# Patient Record
Sex: FEMALE | Employment: FULL TIME | URBAN - METROPOLITAN AREA
[De-identification: names, ages, dates, MRNs, and addresses within clinical notes are randomized per-mention and may not be internally consistent; named-entity substitution may affect disease eponyms.]

---

## 2017-06-05 ENCOUNTER — HOSPITAL ENCOUNTER (OUTPATIENT)
Dept: PHYSICAL THERAPY | Age: 42
Discharge: HOME | End: 2017-06-05
Payer: COMMERCIAL

## 2017-06-05 PROCEDURE — 97161 PT EVAL LOW COMPLEX 20 MIN: CPT

## 2017-06-05 PROCEDURE — 97110 THERAPEUTIC EXERCISES: CPT

## 2017-06-05 NOTE — PROGRESS NOTES
[facility] - [facility], [address] [place] [address]  [phone] fax  Plan of Care/ Statement of Necessity for Physical Therapy Services     Patient name: Joana Anne Start of Care: 2017   Referral source: Cris Lane MD : 1975    Medical Diagnosis: Low back pain [M54.5]   Onset Date:2-3 months    Treatment Diagnosis: back pain   Prior Hospitalization: see medical history Provider#: [id number]   Medications: Verified on Patient summary List    Comorbidities: prior surgeries   Prior Level of Function: Functionally independent, lives with  and three children, 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 Assessment/ key information: Patient is a pleasant 40year old female who presents with complaints of growing back pain over the past year without specific injury. She reports history of some back discomfort with prolonged sitting, but states it has recently worsened as she works in the [place] wearing a lead X-Ray gown for at least 40 hours a week standing up. She is able to occasionally remove the gown, but has discomfort in her Left mid back preventing her from being as active as she would like. Due to her pain she limits that amount of running and yoga that she performs weekly. At initial evaluation Patient demonstrates full lumbar AROM without significant pain; some hypermobility into flexion. Grossly 5/5 upper and lower extremity strength, with mild back pain upon resistance. Negative special tests for radicular symptoms. Tender to palpation in the Left thoracic spine. Slight curvature palpable in the spine. Patient is a good rehab candidate based on premorbid status and will benefit from skilled physical therapy in order to improve overall stability and to more easily perform job tasks.      Evaluation Complexity History MEDIUM  Complexity : 1-2 comorbidities / personal factors will impact the outcome/ POC ; Examination LOW Complexity : 1-2 Standardized tests and measures addressing body structure, function, activity limitation and / or participation in recreation  ;Presentation LOW Complexity : Stable, uncomplicated  ;Clinical Decision Making MEDIUM Complexity : FOTO score of 26-74  Overall Complexity Rating: LOW   Problem List: pain affecting function, decrease ROM, decrease strength, edema affecting function, impaired gait/ balance, decrease ADL/ functional abilitiies, decrease activity tolerance, decrease flexibility/ joint mobility and decrease transfer abilities   Treatment Plan may include any combination of the following: Therapeutic exercise, Therapeutic activities, Neuromuscular re-education, Physical agent/modality, Gait/balance training, Manual therapy, Aquatic therapy, Patient education, Self Care training, Functional mobility training, Home safety training and Stair training  Patient / Family readiness to learn indicated by: asking questions, trying to perform skills and interest  Persons(s) to be included in education: patient (P)  Barriers to Learning/Limitations: None  Patient Goal (s): If I could tolerate it more  Patient Self Reported Health Status: good  Rehabilitation Potential: fair    Short Term Goals: To be accomplished in 1 weeks:  Goal: Patient will be independent and compliant with HEP in order to progress toward long term goals. Status at last note/certification: Issued and reviewed  Long Term Goals: To be accomplished in 8 treatments:  Goal: Patient will improve FOTO assessment score to 78 in order to indicate improved functional abilities. Status at last note/certification: 63  Goal: Patient will report worst back pain as 4/10 or less in order to improve ease of daily tasks.   Status at last note/certification: 7/24  Goal: Patient will report no increase in back pain with driving and standing for at least 45 minutes at a time in order to improve ease of commute to work and completing job tasks. Status at last note/certification: Pain after 45 minutes  Goal: Patient will report at least 50% improvement in overall function in order to transition to premorbid workout routine after discharge. Status at last note/certification: n/a    Frequency / Duration: Patient to be seen 1 times per week for 8 treatments. Patient/ Caregiver education and instruction: Diagnosis, prognosis, exercises   [x]  Plan of care has been reviewed with SALMA Briones, PT 6/5/2017 8:15 AM  _____________________________________________________________________  I certify that the above Therapy Services are being furnished while the patient is under my care. I agree with the treatment plan and certify that this therapy is necessary.     Physician's Signature:____________________  Date:__________Time:______    Please sign and return to [facility] - [address]  [phone] fax

## 2017-06-05 NOTE — PROGRESS NOTES
PT DAILY TREATMENT NOTE     Patient Name: Charmayne Konig  Date:2017  : 1975  [x]  Patient  Verified  Payor: Kg Sanders / Plan: [employer] / Product Type: HMO /    In time:835  Out time:917  Total Treatment Time (min): 42  Visit #: 1 of 8    Treatment Area: Low back pain [M54.5]    SUBJECTIVE  Pain Level (0-10 scale): 3  Any medication changes, allergies to medications, adverse drug reactions, diagnosis change, or new procedure performed?: [x] No    [] Yes (see summary sheet for update)  Subjective functional status/changes:   [] No changes reported      OBJECTIVE    Modality rationale: decrease pain and increase tissue extensibility to improve the patients ability to improve ease of job tasks   Min Type Additional Details    [] Estim:  []Unatt       []IFC  []Premod                        []Other:  []w/ice   []w/heat  Position:  Location:    [] Estim: []Att    []TENS instruct  []NMES                    []Other:  []w/US   []w/ice   []w/heat  Position:  Location:    []  Traction: [] Cervical       []Lumbar                       [] Prone          []Supine                       []Intermittent   []Continuous Lbs:  [] before manual  [] after manual    []  Ultrasound: []Continuous   [] Pulsed                           []1MHz   []3MHz W/cm2:  Location:    []  Iontophoresis with dexamethasone         Location: [] Take home patch   [] In clinic   10 []  Ice     [x]  heat  []  Ice massage  []  Laser   []  Anodyne Position:supine with wedge  Location:back    []  Laser with stim  []  Other:  Position:  Location:    []  Vasopneumatic Device Pressure:       [] lo [] med [] hi   Temperature: [] lo [] med [] hi   [] Skin assessment post-treatment:  []intact []redness- no adverse reaction    []redness - adverse reaction:     24 min [x]Eval                  []Re-Eval       8 min Therapeutic Exercise:  [] See flow sheet :HEP   Rationale: increase strength to improve the patients ability to perform daily tasks    With   [] TE   [] TA   [] neuro   [] other: Patient Education: [x] Review HEP    [] Progressed/Changed HEP based on:   [] positioning   [] body mechanics   [] transfers   [] heat/ice application    [] other:      Other Objective/Functional Measures:      Pain Level (0-10 scale) post treatment: 3    ASSESSMENT/Changes in Function:     Patient will continue to benefit from skilled PT services to modify and progress therapeutic interventions, address functional mobility deficits, address ROM deficits, address strength deficits, analyze and address soft tissue restrictions, analyze and cue movement patterns, analyze and modify body mechanics/ergonomics, assess and modify postural abnormalities, address imbalance/dizziness and instruct in home and community integration to attain remaining goals.      [x]  See Plan of Care  []  See progress note/recertification  []  See Discharge Summary         Progress towards goals / Updated goals:  See POC    PLAN  []  Upgrade activities as tolerated     [x]  Continue plan of care  []  Update interventions per flow sheet       []  Discharge due to:_  []  Other:_      Tre Alvarez PT 6/5/2017  8:14 AM    Future Appointments  Date Time Provider Bob Henry   6/5/2017 8:30 AM ROXANA Shepard

## 2017-06-12 ENCOUNTER — HOSPITAL ENCOUNTER (OUTPATIENT)
Dept: PHYSICAL THERAPY | Age: 42
Discharge: HOME | End: 2017-06-12
Payer: COMMERCIAL

## 2017-06-12 PROCEDURE — 97112 NEUROMUSCULAR REEDUCATION: CPT

## 2017-06-12 NOTE — PROGRESS NOTES
PT DAILY TREATMENT NOTE     Patient Name: Ivan Soriano  Date:2017  : 1975  [x]  Patient  Verified  Payor: Paola Patton / Plan: [employer] / Product Type: HMO /    In time:9:05  Out time:9:40  Total Treatment Time (min): 35  Visit #: 2 of 8    Treatment Area: Low back pain [M54.5]    SUBJECTIVE  Pain Level (0-10 scale):  2  Any medication changes, allergies to medications, adverse drug reactions, diagnosis change, or new procedure performed?: [x] No    [] Yes (see summary sheet for update)  Subjective functional status/changes:   [] No changes reported  I get really stiff after sitting or driving    OBJECTIVE    Modality rationale: decrease pain and increase tissue extensibility to improve the patients ability to improve activity tolerance   Min Type Additional Details    [] Estim:  []Unatt       []IFC  []Premod                        []Other:  []w/ice   []w/heat  Position:  Location:    [] Estim: []Att    []TENS instruct  []NMES                    []Other:  []w/US   []w/ice   []w/heat  Position:  Location:    []  Traction: [] Cervical       []Lumbar                       [] Prone          []Supine                       []Intermittent   []Continuous Lbs:  [] before manual  [] after manual    []  Ultrasound: []Continuous   [] Pulsed                           []1MHz   []3MHz W/cm2:  Location:    []  Iontophoresis with dexamethasone         Location: [] Take home patch   [] In clinic   10 []  Ice     [x]  heat  []  Ice massage  []  Laser   []  Anodyne Position: supine  Location: LB    []  Laser with stim  []  Other:  Position:  Location:    []  Vasopneumatic Device Pressure:       [] lo [] med [] hi   Temperature: [] lo [] med [] hi   [x] Skin assessment post-treatment:  [x]intact []redness- no adverse reaction    []redness - adverse reaction:          25 min Neuromuscular Re-education:  []  See flow sheet :   Rationale: increase strength and improve coordination  to improve the patients ability to imrpove core andpostureal strength forease with standing, job duteis          With   [] TE   [] TA   [] neuro   [] other: Patient Education: [x] Review HEP    [] Progressed/Changed HEP based on:   [] positioning   [] body mechanics   [] transfers   [] heat/ice application    [] other:      Other Objective/Functional Measures:  Initiated ex program     Pain Level (0-10 scale) post treatment:  0    ASSESSMENT/Changes in Function:    First follow-up after eval.  Challenged with UE wall clocks and slides due to pulling and weakness in L shoulder    Patient will continue to benefit from skilled PT services to modify and progress therapeutic interventions, address functional mobility deficits, address ROM deficits, address strength deficits, analyze and address soft tissue restrictions, analyze and cue movement patterns, analyze and modify body mechanics/ergonomics, assess and modify postural abnormalities, address imbalance/dizziness and instruct in home and community integration to attain remaining goals. []  See Plan of Care  []  See progress note/recertification  []  See Discharge Summary         Progress towards goals / Updated goals:Goal: Patient will be independent and compliant with HEP in order to progress toward long term goals. Status at last note/certification: Issued and reviewed  Long Term Goals: To be accomplished in 8 treatments:  Goal: Patient will improve FOTO assessment score to 78 in order to indicate improved functional abilities. Status at last note/certification: 63  Goal: Patient will report worst back pain as 4/10 or less in order to improve ease of daily tasks. Status at last note/certification: 1/88  Goal: Patient will report no increase in back pain with driving and standing for at least 45 minutes at a time in order to improve ease of commute to work and completing job tasks.   Status at last note/certification: Pain after 45 minutes  Goal: Patient will report at least 50% improvement in overall function in order to transition to premorbid workout routine after discharge.   Status at last note/certification: n/a    PLAN  [x]  Upgrade activities as tolerated     []  Continue plan of care  []  Update interventions per flow sheet       []  Discharge due to:_  []  Other:_      Vania Gu, PTA 6/12/2017  9:32 AM    Future Appointments  Date Time Provider Bob Henry   6/21/2017 4:00  [address] [facility]   6/27/2017 4:00 PM Rosi Estevez, PT Simran March

## 2017-06-27 ENCOUNTER — APPOINTMENT (OUTPATIENT)
Dept: PHYSICAL THERAPY | Age: 42
End: 2017-06-27
Payer: COMMERCIAL

## 2017-06-27 NOTE — PROGRESS NOTES
[facility] - [facility], [address] [place] [address]  [phone] fax    Discharge Summary    Patient name: Gela Washburn Start of Care: 2017   Referral source: Margo Castellanos MD : 1975                          Medical Diagnosis: Low back pain [M54.5] Onset Date:2-3 months                          Treatment Diagnosis: back pain   Prior Hospitalization: see medical history Provider#: [id number]   Medications: Verified on Patient summary List    Comorbidities: prior surgeries   Prior Level of Function: Functionally independent, lives with  and three children, 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 Status at last note/certification: Issued and reviewed  Status at discharge: Unable to reassess, unplanned discharge    Goal: Patient will improve FOTO assessment score to 78 in order to indicate improved functional abilities. Status at last note/certification: 63  Status at discharge: Unable to reassess, unplanned discharge    Goal: Patient will report worst back pain as 4/10 or less in order to improve ease of daily tasks. Status at last note/certification:   Status at discharge: Unable to reassess, unplanned discharge    Goal: Patient will report no increase in back pain with driving and standing for at least 45 minutes at a time in order to improve ease of commute to work and completing job tasks. Status at last note/certification: Pain after 45 minutes  Status at discharge: Unable to reassess, unplanned discharge    Goal: Patient will report at least 50% improvement in overall function in order to transition to premorbid workout routine after discharge.   Status at last note/certification: n/a  Status at discharge: Unable to reassess, unplanned discharge      Assessment/ Summary of Care: Patient attended initial evaluation and one follow up session of therapy for back pain. At evaluation she was issued an extensive HEP due to a high copay. At this time she defers treatment; unable to reassess goals due to unplanned discharge. Should she require further treatment in the future we would be pleased to treat her. Thank you for the referral of this Patient.      RECOMMENDATIONS:  [x]Discontinue therapy: []Patient has reached or is progressing toward set goals      [x]Patient is non-compliant or has abdicated      []Due to lack of appreciable progress towards set [facility], PT 6/27/2017 4:46 PM

## 2019-05-30 ENCOUNTER — HOSPITAL ENCOUNTER (OUTPATIENT)
Dept: MAMMOGRAPHY | Age: 44
Discharge: HOME | End: 2019-05-30
Attending: FAMILY MEDICINE
Payer: COMMERCIAL

## 2019-05-30 DIAGNOSIS — Z12.31 VISIT FOR SCREENING MAMMOGRAM: ICD-10-CM

## 2019-05-30 PROCEDURE — 77067 SCR MAMMO BI INCL CAD: CPT

## 2020-01-30 ENCOUNTER — HOSPITAL ENCOUNTER (OUTPATIENT)
Dept: LAB | Age: 45
Discharge: HOME | End: 2020-01-30
Payer: COMMERCIAL

## 2020-01-30 ENCOUNTER — HOSPITAL ENCOUNTER (OUTPATIENT)
Dept: GENERAL RADIOLOGY | Age: 45
Discharge: HOME | End: 2020-01-30
Payer: COMMERCIAL

## 2020-01-30 DIAGNOSIS — Z01.818 PREOP TESTING: ICD-10-CM

## 2020-01-30 DIAGNOSIS — M20.10 ACQUIRED HALLUX VALGUS: ICD-10-CM

## 2020-01-30 DIAGNOSIS — M25.774 OSTEOPHYTE OF RIGHT FOOT: ICD-10-CM

## 2020-01-30 DIAGNOSIS — M20.41 ACQUIRED HAMMER TOE OF RIGHT FOOT: ICD-10-CM

## 2020-01-30 DIAGNOSIS — M77.41 METATARSALGIA OF RIGHT FOOT: ICD-10-CM

## 2020-01-30 DIAGNOSIS — Z01.818 OTHER SPECIFIED PRE-OPERATIVE EXAMINATION: ICD-10-CM

## 2020-01-30 DIAGNOSIS — M20.11 ACQUIRED HALLUX VALGUS OF RIGHT FOOT: ICD-10-CM

## 2020-01-30 LAB
APTT PPP: 26 SEC (ref 23–36.4)
INR PPP: 1 (ref 0.8–1.2)
PROTHROMBIN TIME: 12.6 SEC (ref 11.5–15.2)

## 2020-01-30 PROCEDURE — 36415 COLL VENOUS BLD VENIPUNCTURE: CPT

## 2020-01-30 PROCEDURE — 71046 X-RAY EXAM CHEST 2 VIEWS: CPT

## 2020-01-30 PROCEDURE — 85610 PROTHROMBIN TIME: CPT

## 2020-01-30 PROCEDURE — 85730 THROMBOPLASTIN TIME PARTIAL: CPT

## 2022-03-04 ENCOUNTER — OFFICE VISIT (OUTPATIENT)
Dept: ORTHOPEDIC SURGERY | Age: 47
End: 2022-03-04
Payer: COMMERCIAL

## 2022-03-04 VITALS
HEART RATE: 82 BPM | OXYGEN SATURATION: 99 % | WEIGHT: 160 LBS | HEIGHT: 63 IN | TEMPERATURE: 98.2 F | BODY MASS INDEX: 28.35 KG/M2

## 2022-03-04 DIAGNOSIS — M25.561 RIGHT KNEE PAIN, UNSPECIFIED CHRONICITY: Primary | ICD-10-CM

## 2022-03-04 DIAGNOSIS — S83.91XA SPRAIN OF RIGHT KNEE, UNSPECIFIED LIGAMENT, INITIAL ENCOUNTER: ICD-10-CM

## 2022-03-04 PROCEDURE — 73560 X-RAY EXAM OF KNEE 1 OR 2: CPT | Performed by: PHYSICIAN ASSISTANT

## 2022-03-04 PROCEDURE — 99213 OFFICE O/P EST LOW 20 MIN: CPT | Performed by: PHYSICIAN ASSISTANT

## 2022-03-04 RX ORDER — HYDROCODONE BITARTRATE AND ACETAMINOPHEN 5; 325 MG/1; MG/1
1 TABLET ORAL
Qty: 28 TABLET | Refills: 0 | Status: SHIPPED | OUTPATIENT
Start: 2022-03-04 | End: 2022-03-11

## 2022-03-04 RX ORDER — MELOXICAM 15 MG/1
15 TABLET ORAL
Qty: 30 TABLET | Refills: 2 | Status: SHIPPED | OUTPATIENT
Start: 2022-03-04

## 2022-03-04 NOTE — PROGRESS NOTES
Fernanda Swartz  1975   Chief Complaint   Patient presents with    Knee Pain     follow up knee pain RT         HISTORY OF PRESENT ILLNESS  Fernanda Swartz is a 56 y.o. female who presents today for evaluation of right knee pain. She states she was skiing and after a fall she had some discomfort in her knee. She is able to bear weight but still has some pain on a day-to-day basis. It is more of a nagging nuisance. She is able to do all activities of daily living. Has taken Tylenol for the problem. Pain is a 10/10. Has tried following treatments: Injections:NO; Brace:YES; Therapy:NO; Cane/Crutch:YES       No Known Allergies     Past Medical History:   Diagnosis Date    Diabetes ([facility] [address]     hypoglycemia    Hypertension     Other ill-defined conditions(799.89)     depression      Social History     Socioeconomic History    Marital status:      Spouse name: Not on file    Number of children: Not on file    Years of education: Not on file    Highest education level: Not on file   Occupational History    Not on file   Tobacco Use    Smoking status: Never Smoker    Smokeless tobacco: Never Used   Vaping Use    Vaping Use: Never used   Substance and Sexual Activity    Alcohol use: No    Drug use: Never    Sexual activity: Not on file   Other Topics Concern    Not on file   Social History Narrative    Not on file     Social Determinants of Health     Financial Resource Strain:     Difficulty of Paying Living Expenses: Not on file   Food Insecurity:     Worried About [address] in the Last Year: Not on file    Josephine of Food in the Last Year: Not on file   Transportation Needs:     Lack of Transportation (Medical): Not on file    Lack of Transportation (Non-Medical):  Not on file   Physical Activity:     Days of Exercise per Week: Not on file    Minutes of Exercise per Session: Not on file   Stress:     Feeling of Stress : Not on file   Social Connections:     Frequency of Communication with Friends and Family: Not on file    Frequency of Social Gatherings with Friends and Family: Not on file    Attends Scientologist Services: Not on file    Active Member of Clubs or Organizations: Not on file    Attends Club or Organization Meetings: Not on file    Marital Status: Not on file   Intimate Partner Violence:     Fear of Current or Ex-Partner: Not on file    Emotionally Abused: Not on file    Physically Abused: Not on file    Sexually Abused: Not on file   Housing Stability:     Unable to Pay for Housing in the Last Year: Not on file    Number of [place] in the Last Year: Not on file    Unstable Housing in the Last Year: Not on file      Past Surgical History:   Procedure Laterality Date    HX CHOLECYSTECTOMY        History reviewed. No pertinent family history. Current Outpatient Medications   Medication Sig    meloxicam (Mobic) 15 mg tablet Take 1 Tablet by mouth daily (with breakfast).  HYDROcodone-acetaminophen (Norco) 5-325 mg per tablet Take 1 Tablet by mouth every six (6) hours as needed for Pain for up to 7 days. Max Daily Amount: 4 Tablets.  potassium chloride (K-DUR, KLOR-CON) 20 mEq tablet Take 0.5 Tabs by mouth daily.  traMADol (ULTRAM) 50 mg tablet Take 50 mg by mouth every six (6) hours as needed.  LORazepam (ATIVAN) 0.5 mg tablet Take 0.5 mg by mouth.  buPROPion XL (WELLBUTRIN XL) 300 mg XL tablet Take 300 mg by mouth every morning. No current facility-administered medications for this visit. REVIEW OF SYSTEM   Patient denies: Weight loss, Fever/Chills, HA, Visual changes, Fatigue, Chest pain, SOB, Abdominal pain, N/V/D/C, Blood in stool or urine, Edema. Pertinent positive as above in HPI.  All others were negative    PHYSICAL EXAM:   Visit Vitals  Pulse 82   Temp 98.2 °F (36.8 °C) (Temporal)   Ht 5' 3\" (1.6 m)   Wt 160 lb (72.6 kg)   SpO2 99%   BMI 28.34 kg/m²     The patient is a well-developed, well-nourished female   in no acute distress. The patient is alert and oriented times three. The patient is alert and oriented times three. Mood and affect are normal.  LYMPHATIC: lymph nodes are not enlarged and are within normal limits  SKIN: normal in color and non tender to palpation. There are no bruises or abrasions noted. NEUROLOGICAL: Motor sensory exam is within normal limits. Reflexes are equal bilaterally. There is normal sensation to pinprick and light touch  MUSCULOSKELETAL:  Examination Right knee   Skin Intact   Range of motion 20-40   Effusion +   Medial joint line tenderness +   Lateral joint line tenderness -   Tenderness Pes Bursa -   Tenderness insertion MCL -   Tenderness insertion LCL -   Miltons -   Patella crepitus -   Patella grind -   Lachman -   Pivot shift -   Anterior drawer -   Posterior drawer -   Varus stress -   Valgus stress -   Neurovascular Intact   Calf Swelling and Tenderness to Palpation -   Angelito's Test -   Hamstring Cord Tightness -       IMAGIN view x-rays of the right knee taken in the office on 3/4/2022 read and reviewed by myself reveal no acute abnormalities    IMPRESSION:      ICD-10-CM ICD-9-CM    1. Right knee pain, unspecified chronicity  M25.561 719.46 AMB POC XRAY, KNEE; 1/2 VIEWS      meloxicam (Mobic) 15 mg tablet      HYDROcodone-acetaminophen (Norco) 5-325 mg per tablet      AMB SUPPLY ORDER   2. Sprain of right knee, unspecified ligament, initial encounter  S83. 91XA 844.9         PLAN:   1. Patient with acute knee sprain. We will work on her range of motion and this is likely causing her pain. Prescription for Mobic given. Hinged brace today to help with support. Physician directed exercise program given to the patient today  Risk factors include: n/a  2. No cortisone injection indicated today   3. No Physical/Occupational Therapy indicated today  4. No diagnostic test indicated today:   5. Yes durable medical equipment indicated today  6.  No referral indicated today 7. Yes medications indicated today:   8.  No Narcotic indicated today     RTC 2 weeks     ALETA Payne and Spine Specialist

## 2022-03-07 ENCOUNTER — TELEPHONE (OUTPATIENT)
Dept: ORTHOPEDIC SURGERY | Age: 47
End: 2022-03-07

## 2022-03-07 DIAGNOSIS — M25.561 ACUTE PAIN OF RIGHT KNEE: Primary | ICD-10-CM

## 2022-03-07 NOTE — LETTER
3/7/2022 11:19 AM    Ms. Felicita Chopra  [address]      The above patient will return to work on Wednesday with the following restrictions: No lifting greater than 25lbs x 1 week.          Sincerely,      DOMINIQUE Reed

## 2022-03-07 NOTE — TELEPHONE ENCOUNTER
Patient called. He knee buckled on her over the weekend. Unable to bear weight.  Swelling worse    Will order stat mri

## 2022-03-08 ENCOUNTER — HOSPITAL ENCOUNTER (OUTPATIENT)
Age: 47
Discharge: HOME | End: 2022-03-08
Attending: PHYSICIAN ASSISTANT

## 2022-03-08 DIAGNOSIS — M25.561 ACUTE PAIN OF RIGHT KNEE: ICD-10-CM

## 2022-03-08 PROCEDURE — 73721 MRI JNT OF LWR EXTRE W/O DYE: CPT

## 2022-03-09 ENCOUNTER — DOCUMENTATION ONLY (OUTPATIENT)
Dept: ORTHOPEDIC SURGERY | Age: 47
End: 2022-03-09

## 2022-03-09 ENCOUNTER — TELEPHONE (OUTPATIENT)
Dept: ORTHOPEDIC SURGERY | Age: 47
End: 2022-03-09

## 2022-03-09 DIAGNOSIS — S83.241D ACUTE MEDIAL MENISCUS TEAR, RIGHT, SUBSEQUENT ENCOUNTER: ICD-10-CM

## 2022-03-09 DIAGNOSIS — S83.511A RUPTURE OF ANTERIOR CRUCIATE LIGAMENT OF RIGHT KNEE, INITIAL ENCOUNTER: Primary | ICD-10-CM

## 2022-03-09 NOTE — TELEPHONE ENCOUNTER
Please order an acl brace for the patient and fax urgently to [facility]    Also PT order placed.  Please call patient and ask where she would like to us to fax order to

## 2022-03-14 ENCOUNTER — OFFICE VISIT (OUTPATIENT)
Dept: ORTHOPEDIC SURGERY | Age: 47
End: 2022-03-14
Payer: COMMERCIAL

## 2022-03-14 VITALS
DIASTOLIC BLOOD PRESSURE: 74 MMHG | OXYGEN SATURATION: 100 % | BODY MASS INDEX: 28.34 KG/M2 | HEART RATE: 88 BPM | SYSTOLIC BLOOD PRESSURE: 107 MMHG | WEIGHT: 160 LBS

## 2022-03-14 DIAGNOSIS — S83.231A COMPLEX TEAR OF MEDIAL MENISCUS OF RIGHT KNEE AS CURRENT INJURY, INITIAL ENCOUNTER: ICD-10-CM

## 2022-03-14 DIAGNOSIS — S83.511A RUPTURE OF ANTERIOR CRUCIATE LIGAMENT OF RIGHT KNEE, INITIAL ENCOUNTER: Primary | ICD-10-CM

## 2022-03-14 PROCEDURE — 99214 OFFICE O/P EST MOD 30 MIN: CPT | Performed by: PHYSICIAN ASSISTANT

## 2022-03-14 NOTE — PROGRESS NOTES
Harriet Juan  1975   Chief Complaint   Patient presents with    Knee Pain     RT        HISTORY OF PRESENT ILLNESS  Harriet Juan is a 56 y.o. female who presents today for reevaluation and MRI review of right knee pain. Last week, 3/6/2022, pt notes her knee buckled and she was unable to bear weight. This has now improved. Has been completing a physician directed exercises program with marked improvement. Has been taking Mobic and wearing a hinge brace. Initial injury was skiing and after a fall she had some discomfort in her knee. Has taken Tylenol for the problem. Pain is a 0/10. Patient denies any fever, chills, chest pain, shortness of breath or calf pain. The remainder of the review of systems is negative. There are no new illness or injuries to report since last seen in the office. There are no changes to medications, allergies, family or social history. PHYSICAL EXAM:   Visit Vitals  /74 (BP 1 Location: Right upper arm, BP Patient Position: Sitting)   Pulse 88   Wt 160 lb (72.6 kg)   SpO2 100%   BMI 28.34 kg/m²     The patient is a well-developed, well-nourished female   in no acute distress. The patient is alert and oriented times three. The patient is alert and oriented times three. Mood and affect are normal.  LYMPHATIC: lymph nodes are not enlarged and are within normal limits  SKIN: normal in color and non tender to palpation. There are no bruises or abrasions noted. NEUROLOGICAL: Motor sensory exam is within normal limits. Reflexes are equal bilaterally.  There is normal sensation to pinprick and light touch  MUSCULOSKELETAL:  Examination Right knee   Skin Intact   Range of motion 0-125   Effusion +   Medial joint line tenderness +   Lateral joint line tenderness -   Tenderness Pes Bursa -   Tenderness insertion MCL -   Tenderness insertion LCL -   Miltons -   Patella crepitus -   Patella grind -   Lachman -   Pivot shift -   Anterior drawer -   Posterior drawer -   Varus stress -   Valgus stress -   Neurovascular Intact   Calf Swelling and Tenderness to Palpation -   Angelito's Test -   Hamstring Cord Tightness -       IMAGING: MRI of the left knee dated 3/8/2022 was reviewed and read by myself:   IMPRESSION  Complete ACL tear. Medial meniscus complex tear including bucket-handle component. LCL likely grade 1-2 injury as described. Semimembranosus insertion advanced tendinopathy with partial tear. Posterior medial and lateral tibial plateau contusions with possible small  subchondral fracture at the medial tibial plateau. Moderate volume joint effusion. Mild soft tissue edema about the knee. 2 view x-rays of the right knee taken in the office on 3/4/2022 read and reviewed by myself reveal no acute abnormalities    IMPRESSION:      ICD-10-CM ICD-9-CM    1. Rupture of anterior cruciate ligament of right knee, initial encounter  S83.511A 844.2    2. Complex tear of medial meniscus of right knee as current injury, initial encounter  S83.231A 836.0         PLAN:   1. Patient with right knee MRI documented complete ACL tear and medial meniscus tear and tibial plateau contusions. I discussed treatment options today including surgery she would like to hold off to finish her work contracts before surgery. Ordering an ACL brace. Advised to continue with physician directed exercise program. Return as needed. Risk factors include: n/a  2. No cortisone injection indicated today   3. No Physical/Occupational Therapy indicated today  4. No diagnostic test indicated today:   5. Yes durable medical equipment indicated today  6. No referral indicated today   7. Yes medications indicated today:   8.  No Narcotic indicated today     RTC prn     Scribed by Susanna Recio [facility]) as dictated by ALETA Greene PA-C [phone]

## 2022-03-15 ENCOUNTER — APPOINTMENT (OUTPATIENT)
Dept: PHYSICAL THERAPY | Age: 47
End: 2022-03-15

## 2022-11-18 ENCOUNTER — TELEPHONE (OUTPATIENT)
Dept: ORTHOPEDIC SURGERY | Age: 47
End: 2022-11-18

## 2023-08-28 NOTE — LETTER
NOTIFICATION RETURN TO WORK / SCHOOL    3/4/2022 1:49 PM    Ms. Nehemias Ni  [address]      To Whom It May Concern:    Nehemias Ni is currently under the care of [address] [place] [address] Neeraj Blank. She will be off work on Tuesday March 8, 2022. If there are questions or concerns please have the patient contact our office.         Sincerely,      DOMINIQUE Lai Refill Decision Note   Cassandra Campos  is requesting a refill authorization.  Brief Assessment and Rationale for Refill:  Quick Discontinue     Medication Therapy Plan:  The original prescription was discontinued on 5/23/2023 by Emil Zhang MD      Comments:     Note composed:4:19 AM 08/28/2023